# Patient Record
Sex: MALE | Race: WHITE | NOT HISPANIC OR LATINO | Employment: FULL TIME | URBAN - METROPOLITAN AREA
[De-identification: names, ages, dates, MRNs, and addresses within clinical notes are randomized per-mention and may not be internally consistent; named-entity substitution may affect disease eponyms.]

---

## 2018-03-26 ENCOUNTER — OFFICE VISIT (OUTPATIENT)
Dept: FAMILY MEDICINE CLINIC | Facility: CLINIC | Age: 63
End: 2018-03-26
Payer: COMMERCIAL

## 2018-03-26 VITALS
WEIGHT: 192 LBS | BODY MASS INDEX: 31.95 KG/M2 | DIASTOLIC BLOOD PRESSURE: 80 MMHG | TEMPERATURE: 98.3 F | SYSTOLIC BLOOD PRESSURE: 138 MMHG

## 2018-03-26 DIAGNOSIS — J01.00 ACUTE MAXILLARY SINUSITIS, RECURRENCE NOT SPECIFIED: Primary | ICD-10-CM

## 2018-03-26 PROCEDURE — 99214 OFFICE O/P EST MOD 30 MIN: CPT | Performed by: FAMILY MEDICINE

## 2018-03-26 RX ORDER — AMOXICILLIN 875 MG/1
875 TABLET, COATED ORAL 2 TIMES DAILY
Qty: 20 TABLET | Refills: 1 | Status: SHIPPED | OUTPATIENT
Start: 2018-03-26 | End: 2018-04-05

## 2018-03-26 RX ORDER — IBUPROFEN 200 MG
TABLET ORAL
COMMUNITY

## 2018-03-26 RX ORDER — DEXTROMETHORPHAN HYDROBROMIDE AND PROMETHAZINE HYDROCHLORIDE 15; 6.25 MG/5ML; MG/5ML
5 SYRUP ORAL 4 TIMES DAILY PRN
Qty: 118 ML | Refills: 0 | Status: SHIPPED | OUTPATIENT
Start: 2018-03-26 | End: 2020-08-26 | Stop reason: ALTCHOICE

## 2018-03-26 RX ORDER — BETAMETHASONE DIPROPIONATE 0.5 MG/G
CREAM TOPICAL 2 TIMES DAILY
COMMUNITY
Start: 2014-09-09 | End: 2018-03-26

## 2018-03-26 NOTE — PROGRESS NOTES
Assessment/Plan:  Acute maxillary sinusitis  The patient is a persistent respiratory syndrome which I believe represents acute sinusitis  We are going to treat with Amoxil 875 b i d  for 10 days  Were also going to give him some promethazine DM for symptomatic relief  Will push fluids and rest   He is asked to call in 1 week if his symptoms have not significantly improved or sooner as needed  He agrees  Diagnoses and all orders for this visit:    Acute maxillary sinusitis, recurrence not specified  -     amoxicillin (AMOXIL) 875 mg tablet; Take 1 tablet (875 mg total) by mouth 2 (two) times a day for 10 days  -     promethazine-dextromethorphan (PHENERGAN-DM) 6 25-15 mg/5 mL oral syrup; Take 5 mL by mouth 4 (four) times a day as needed for cough    Other orders  -     ibuprofen (ADVIL) 200 mg tablet; Take by mouth  -     Discontinue: betamethasone dipropionate (DIPROSONE) 0 05 % cream; Apply topically 2 (two) times a day          Subjective:   Chief Complaint   Patient presents with    Cold Like Symptoms     xs 1 week    Cough     phem (yellow)    Headache     pt described as splitting    Nasal Congestion     hard to breath     Fatigue     I have had severe L sided HA that seemed improved  It seemed bad Saturday  Nasal d/c and sputum is yellow  No facial pressure  Mild wheeze and SOB  Awakens him at night  No history of same  Fever, some myalgias  No N/V/D  No flu vaccine  No cigarettes  Patient ID: Dasia Case is a 65 y o  male  HPI  The patient is a 70-year-old male who presents today with respiratory syndrome is which has been present for greater than 1 week  Wife and daughter had similar symptoms  He has a cough productive of yellow sputum which is most likely postnasal drip by description  He had headache and fevers feeling  He has some mild wheezing and shortness of breath was wakes him up at night    He has no history of same no history of asthma or COPD he is a non cigarette smoker  He has had no nausea vomiting or diarrhea  No rash  Did not have a flu vaccine  The following portions of the patient's history were reviewed and updated as appropriate: allergies, current medications, past family history, past medical history, past social history and problem list     Review of Systems   Constitution: Positive for chills and fever  HENT: Positive for congestion  Negative for ear pain and sore throat  Cardiovascular: Negative for chest pain and leg swelling  Respiratory: Positive for cough, shortness of breath, sputum production and wheezing  Negative for hemoptysis  Musculoskeletal: Positive for myalgias  Neurological: Positive for headaches  Negative for focal weakness and light-headedness  Psychiatric/Behavioral: Negative for depression  The patient is not nervous/anxious  Objective:    Physical Exam   Constitutional: He is oriented to person, place, and time  He appears well-developed and well-nourished  HENT:   Mouth/Throat: Oropharynx is clear and moist  No oropharyngeal exudate  Increased Waldeyer's ring  Some mild ethmoidal tenderness bilaterally  Red boggy nasal turbinates  Yellow nasal discharge  Eyes: No scleral icterus  Neck: Neck supple  No JVD present  No thyromegaly present  Cardiovascular: Normal rate, regular rhythm and normal heart sounds  Exam reveals no gallop  No murmur heard  Pulmonary/Chest: Breath sounds normal  No respiratory distress  He has no wheezes  Musculoskeletal: He exhibits no edema  Lymphadenopathy:     He has no cervical adenopathy  Neurological: He is alert and oriented to person, place, and time  Skin: No rash noted  Psychiatric: He has a normal mood and affect

## 2018-03-26 NOTE — ASSESSMENT & PLAN NOTE
The patient is a persistent respiratory syndrome which I believe represents acute sinusitis  We are going to treat with Amoxil 875 b i d  for 10 days  Were also going to give him some promethazine DM for symptomatic relief  Will push fluids and rest   He is asked to call in 1 week if his symptoms have not significantly improved or sooner as needed  He agrees

## 2020-08-26 ENCOUNTER — OFFICE VISIT (OUTPATIENT)
Dept: FAMILY MEDICINE CLINIC | Facility: CLINIC | Age: 65
End: 2020-08-26
Payer: COMMERCIAL

## 2020-08-26 VITALS
BODY MASS INDEX: 28.49 KG/M2 | HEIGHT: 65 IN | TEMPERATURE: 98.1 F | WEIGHT: 171 LBS | SYSTOLIC BLOOD PRESSURE: 130 MMHG | DIASTOLIC BLOOD PRESSURE: 82 MMHG

## 2020-08-26 DIAGNOSIS — H10.32 ACUTE BACTERIAL CONJUNCTIVITIS OF LEFT EYE: Primary | ICD-10-CM

## 2020-08-26 PROBLEM — J01.00 ACUTE MAXILLARY SINUSITIS: Status: RESOLVED | Noted: 2018-03-26 | Resolved: 2020-08-26

## 2020-08-26 PROCEDURE — 3725F SCREEN DEPRESSION PERFORMED: CPT | Performed by: FAMILY MEDICINE

## 2020-08-26 PROCEDURE — 99214 OFFICE O/P EST MOD 30 MIN: CPT | Performed by: FAMILY MEDICINE

## 2020-08-26 PROCEDURE — 3008F BODY MASS INDEX DOCD: CPT | Performed by: FAMILY MEDICINE

## 2020-08-26 RX ORDER — ERYTHROMYCIN 5 MG/G
0.5 OINTMENT OPHTHALMIC EVERY 6 HOURS SCHEDULED
Qty: 3.5 G | Refills: 0 | Status: SHIPPED | OUTPATIENT
Start: 2020-08-26 | End: 2020-12-09 | Stop reason: ALTCHOICE

## 2020-08-26 NOTE — ASSESSMENT & PLAN NOTE
Patient appears to have an acute conjunctivitis OS  Also possibly a small hordeolum  We are going to have him use warm compresses q i d  As well as Ilotycin ointment  He is asked call tomorrow with report of his condition  I see no evidence to suggest that this represents foreign body, acute glaucoma X cetera

## 2020-08-26 NOTE — PROGRESS NOTES
Assessment/Plan:  Acute bacterial conjunctivitis of left eye  Patient appears to have an acute conjunctivitis OS  Also possibly a small hordeolum  We are going to have him use warm compresses q i d  As well as Ilotycin ointment  He is asked call tomorrow with report of his condition  I see no evidence to suggest that this represents foreign body, acute glaucoma X cetera  Diagnoses and all orders for this visit:    Acute bacterial conjunctivitis of left eye  -     erythromycin (ILOTYCIN) ophthalmic ointment; Administer 0 5 inches into the left eye every 6 (six) hours          Subjective:   Chief Complaint   Patient presents with   [address]     left eye          Patient ID: Johnny Downs is a 60 y o  male  HPI  The patient is a 23-year-old male who states that he has been doing a lot of yd work recently  He got some poison ivy on his wrists and is concerned that he may have gotten in his left eye  He had some irritation of his left eye yesterday  He saw some redness and this morning he awoke in with significant discomfort and discharge from the left eye  Lids were not crusted shut  He has had no visual disturbance  Again he has some discomfort but no pain  He has had no upper respiratory symptoms  The following portions of the patient's history were reviewed and updated as appropriate: allergies, current medications, past family history, past medical history, past social history, past surgical history and problem list     Review of Systems   Constitution: Negative  HENT: Negative for congestion, ear pain and sore throat  Eyes: Positive for blurred vision, discharge and redness  Negative for double vision, pain, photophobia, vision loss in left eye, vision loss in right eye, visual disturbance and visual halos  Cardiovascular: Negative  Respiratory: Negative  Skin: Negative  Objective:    Physical Exam   Constitutional: He appears well-developed and well-nourished  No distress  Eyes: Pupils are equal, round, and reactive to light  EOM are normal  Right eye exhibits no discharge  Left eye exhibits discharge  OS reveals significant palpebral erythema/injection  There is also some thick purulent discharge on the lower lid which is removed with Q-tip  Bulbar conjunctiva is relatively normal   There does appear to be a hordeolum in the mid lower lid as well  There is no preauricular node enlargement noted  Neurological: He is alert  Skin: Rash noted  There is erythema  He has some minimal linear erythematous areas about his wrists with vesicular component consistent with rhus dermatitis   Psychiatric: He has a normal mood and affect  Thought content normal    Nursing note and vitals reviewed

## 2020-12-09 ENCOUNTER — TELEMEDICINE (OUTPATIENT)
Dept: FAMILY MEDICINE CLINIC | Facility: CLINIC | Age: 65
End: 2020-12-09
Payer: COMMERCIAL

## 2020-12-09 DIAGNOSIS — Z20.822 EXPOSURE TO COVID-19 VIRUS: Primary | ICD-10-CM

## 2020-12-09 PROBLEM — H10.32 ACUTE BACTERIAL CONJUNCTIVITIS OF LEFT EYE: Status: RESOLVED | Noted: 2020-08-26 | Resolved: 2020-12-09

## 2020-12-09 PROCEDURE — 99212 OFFICE O/P EST SF 10 MIN: CPT | Performed by: FAMILY MEDICINE

## 2021-05-12 ENCOUNTER — IMMUNIZATIONS (OUTPATIENT)
Dept: FAMILY MEDICINE CLINIC | Facility: HOSPITAL | Age: 66
End: 2021-05-12

## 2021-05-12 DIAGNOSIS — Z23 ENCOUNTER FOR IMMUNIZATION: Primary | ICD-10-CM

## 2021-05-12 PROCEDURE — 91300 SARS-COV-2 / COVID-19 MRNA VACCINE (PFIZER-BIONTECH) 30 MCG: CPT

## 2021-05-12 PROCEDURE — 0001A SARS-COV-2 / COVID-19 MRNA VACCINE (PFIZER-BIONTECH) 30 MCG: CPT

## 2021-06-02 ENCOUNTER — IMMUNIZATIONS (OUTPATIENT)
Dept: FAMILY MEDICINE CLINIC | Facility: HOSPITAL | Age: 66
End: 2021-06-02

## 2021-06-02 DIAGNOSIS — Z23 ENCOUNTER FOR IMMUNIZATION: Primary | ICD-10-CM

## 2021-06-02 PROCEDURE — 91300 SARS-COV-2 / COVID-19 MRNA VACCINE (PFIZER-BIONTECH) 30 MCG: CPT

## 2021-06-02 PROCEDURE — 0002A SARS-COV-2 / COVID-19 MRNA VACCINE (PFIZER-BIONTECH) 30 MCG: CPT
